# Patient Record
Sex: FEMALE | Race: WHITE | Employment: UNEMPLOYED | URBAN - METROPOLITAN AREA
[De-identification: names, ages, dates, MRNs, and addresses within clinical notes are randomized per-mention and may not be internally consistent; named-entity substitution may affect disease eponyms.]

---

## 2019-07-16 ENCOUNTER — APPOINTMENT (OUTPATIENT)
Dept: ULTRASOUND IMAGING | Facility: CLINIC | Age: 64
End: 2019-07-16
Attending: PHYSICIAN ASSISTANT

## 2019-07-16 ENCOUNTER — HOSPITAL ENCOUNTER (EMERGENCY)
Facility: CLINIC | Age: 64
Discharge: HOME | End: 2019-07-16
Attending: PHYSICIAN ASSISTANT | Admitting: PHYSICIAN ASSISTANT

## 2019-07-16 VITALS
DIASTOLIC BLOOD PRESSURE: 90 MMHG | RESPIRATION RATE: 18 BRPM | SYSTOLIC BLOOD PRESSURE: 138 MMHG | HEIGHT: 66 IN | WEIGHT: 199 LBS | BODY MASS INDEX: 31.98 KG/M2 | TEMPERATURE: 98 F | OXYGEN SATURATION: 99 %

## 2019-07-16 DIAGNOSIS — L03.116 LEFT LEG CELLULITIS: ICD-10-CM

## 2019-07-16 PROCEDURE — 99213 OFFICE O/P EST LOW 20 MIN: CPT | Mod: Z6 | Performed by: PHYSICIAN ASSISTANT

## 2019-07-16 PROCEDURE — 93971 EXTREMITY STUDY: CPT | Mod: LT

## 2019-07-16 PROCEDURE — G0463 HOSPITAL OUTPT CLINIC VISIT: HCPCS | Mod: 25

## 2019-07-16 RX ORDER — CEPHALEXIN 500 MG/1
500 CAPSULE ORAL 4 TIMES DAILY
Qty: 28 CAPSULE | Refills: 0 | Status: SHIPPED | OUTPATIENT
Start: 2019-07-16 | End: 2019-07-23

## 2019-07-16 ASSESSMENT — MIFFLIN-ST. JEOR: SCORE: 1469.41

## 2019-07-16 NOTE — ED PROVIDER NOTES
History     Chief Complaint   Patient presents with     Wound Check     sore on left lower leg. being treated with doxy, feels no improvement     HPI  Tessy Voss is a 64 year old female who presents with complaints of wound to left lower leg.  Patient states she sustained injury to her left lower leg at the beginning of the month, about 2 weeks ago, after she had a dog leash wrapped around her lower leg/ankle area causing an abrasion/wound.  She states this occurred in [place] and she subsequently drove several hours home and arrived home about 1 week ago.  Patient states she has had a wound to her left lateral ankle from the dog leash that has not been healing.  She was evaluated 5 days ago at her outside clinic and was prescribed Doxycycline for concern over secondary infection.  Patient states she has been taking her antibiotics without improvement and is developing progressive swelling, redness, and pain around the wound.  Denies fevers, chills, body aches, nausea, or vomiting.  Patient has multiple antibiotic allergies but is unsure if she has an allergy to Cephalexin.  Patient denies history of blood clots.  She has been applying triple antibiotic ointment to her wound.      Allergies:  Allergies   Allergen Reactions     Bactrim      Cipro [Ciprofloxacin] Hives     Clindamycin Hives     Erythromycin      Ibuprofen      Penicillin [Penicillins]        Problem List:    There are no active problems to display for this patient.       Past Medical History:    Past Medical History:   Diagnosis Date     HYPOTHYROIDISM NOS        Past Surgical History:    Past Surgical History:   Procedure Laterality Date     APPENDECTOMY       CHOLECYSTECTOMY, LAPOROSCOPIC      Cholecystectomy, Laparoscopic     LAMINECT/DISCECTOMY, LUMBAR      Laminectomy/Discectomy Cervical-L5-S1     TONSILLECTOMY         Family History:    No family history on file.    Social History:  Marital Status:    Social History     Tobacco Use  "    Smoking status: Never Smoker   Substance Use Topics     Alcohol use: Not on file     Drug use: Not on file        Medications:      cephALEXin (KEFLEX) 500 MG capsule   MALARONE 250-100 MG OR TABS   SYNTHROID 125 MCG OR TABS         Review of Systems   Constitutional: Negative.  Negative for fever.   Respiratory: Negative.    Cardiovascular: Negative.    Musculoskeletal: Negative.    Skin: Positive for wound.   Neurological: Negative.    All other systems reviewed and are negative.      Physical Exam   BP: 138/90  Heart Rate: 64  Temp: 98  F (36.7  C)  Resp: 18  Height: 167.6 cm (5' 6\")  Weight: 90.3 kg (199 lb)  SpO2: 99 %      Physical Exam   Constitutional: She appears well-developed and well-nourished. No distress.   HENT:   Head: Normocephalic and atraumatic.   Eyes: Conjunctivae are normal.   Neck: Neck supple.   Cardiovascular: Intact distal pulses.   Pulmonary/Chest: Effort normal.   Musculoskeletal: Normal range of motion.   Wound to left lateral lower leg/ankle with yellow material to wound base.  There is surrounding erythema, tenderness, and warmth.  No fluctuance or induration.  Pt has tenderness to palpation of her calf as well.   Neurological: She is alert. She has normal strength. No sensory deficit.   Skin: Skin is warm and dry.       ED Course        Procedures    Results for orders placed or performed during the hospital encounter of 07/16/19 (from the past 24 hour(s))   US Lower Extremity Venous Duplex Left    Narrative    ULTRASOUND LEFT LOWER EXTREMITY VENOUS DUPLEX July 16, 2019 2:09 PM     HISTORY: Left lower leg pain, swelling, and erythema with wound that  isn't healing, prior long trip preceding symptoms.    COMPARISON: None.    FINDINGS: Gray-scale, color and Doppler spectral analysis ultrasound  was performed of the left leg. Compression and augmentation imaging  was performed.    There is no evidence for deep venous thrombosis.      Impression    IMPRESSION: No evidence for deep " vein thrombosis within the left leg.    JUSTINA DA SILVA MD       Medications - No data to display    Assessments & Plan (with Medical Decision Making)     Pt is a 64 year old female who presents with complaints of wound to left lower leg.  Patient states she sustained injury to her left lower leg at the beginning of the month, about 2 weeks ago, after she had a dog leash wrapped around her lower leg/ankle area causing an abrasion/wound.  She states this occurred in [place] and she subsequently drove several hours home and arrived home about 1 week ago.  Patient states she has had a wound to her left lateral ankle from the dog leash that has not been healing.  She was evaluated 5 days ago at her outside clinic and was prescribed Doxycycline for concern over secondary infection.  Patient states she has been taking her antibiotics without improvement and is developing progressive swelling, redness, and pain around the wound.  No fevers.  Patient has multiple antibiotic allergies but is unsure if she has an allergy to Cephalexin.  Pt is afebrile on arrival.  Exam as above.  Venous ultrasound was obtaining given pt's progressive left lower leg lateral erythema and swelling and pain that is not improving on antibiotics in the context of a recent long car ride.  This was negative for DVT.  Discussed results with patient.  Return precautions were reviewed.  Hand-outs were provided.    Patient was sent with Keflex and was instructed to follow-up with PCP if no improvement in 3-5 days for continued care and management or sooner if new or worsening symptoms.  She is to return to the ED for persistent and/or worsening symptoms.  Patient expressed understanding of the diagnosis and plan and was discharged home in good condition.    I have reviewed the nursing notes.    I have reviewed the findings, diagnosis, plan and need for follow up with the patient.       Medication List      Started    cephALEXin 500 MG  capsule  Commonly known as:  KEFLEX  500 mg, Oral, 4 TIMES DAILY            Final diagnoses:   Left leg cellulitis       7/16/2019   [facility] EMERGENCY DEPARTMENT      Disclaimer:  This note consists of symbols derived from keyboarding, dictation and/or voice recognition software.  As a result, there may be errors in the script that have gone undetected.  Please consider this when interpreting information found in this chart.     Gaby Hobbs PA-C  07/17/19 0017

## 2019-07-16 NOTE — ED AVS SNAPSHOT
[facility] Emergency Department  [address] [address]  Phone:  [phone]  Fax:  [phone]                                    Tessy Voss   MRN: [id number]    Department:  [facility] Emergency Department   Date of Visit:  7/16/2019           After Visit Summary Signature Page    I have received my discharge instructions, and my questions have been answered. I have discussed any challenges I see with this plan with the nurse or doctor.    ..........................................................................................................................................  Patient/Patient Representative Signature      ..........................................................................................................................................  Patient Representative Print Name and Relationship to Patient    ..................................................               ................................................  Date                                   Time    ..........................................................................................................................................  Reviewed by Signature/Title    ...................................................              ..............................................  Date                                               Time          22EPIC Rev 08/18

## 2019-07-17 ASSESSMENT — ENCOUNTER SYMPTOMS
CONSTITUTIONAL NEGATIVE: 1
FEVER: 0
MUSCULOSKELETAL NEGATIVE: 1
WOUND: 1
NEUROLOGICAL NEGATIVE: 1
CARDIOVASCULAR NEGATIVE: 1
RESPIRATORY NEGATIVE: 1